# Patient Record
Sex: FEMALE | Race: WHITE | NOT HISPANIC OR LATINO | URBAN - METROPOLITAN AREA
[De-identification: names, ages, dates, MRNs, and addresses within clinical notes are randomized per-mention and may not be internally consistent; named-entity substitution may affect disease eponyms.]

---

## 2020-01-13 ENCOUNTER — OFFICE (OUTPATIENT)
Dept: URBAN - METROPOLITAN AREA CLINIC 64 | Facility: CLINIC | Age: 30
End: 2020-01-13

## 2020-01-13 VITALS
WEIGHT: 253 LBS | HEART RATE: 70 BPM | HEIGHT: 67 IN | SYSTOLIC BLOOD PRESSURE: 146 MMHG | DIASTOLIC BLOOD PRESSURE: 99 MMHG

## 2020-01-13 DIAGNOSIS — R10.12 LEFT UPPER QUADRANT PAIN: ICD-10-CM

## 2020-01-13 DIAGNOSIS — R11.0 NAUSEA: ICD-10-CM

## 2020-01-13 DIAGNOSIS — R53.83 OTHER FATIGUE: ICD-10-CM

## 2020-01-13 DIAGNOSIS — R13.10 DYSPHAGIA, UNSPECIFIED: ICD-10-CM

## 2020-01-13 DIAGNOSIS — R07.9 CHEST PAIN, UNSPECIFIED: ICD-10-CM

## 2020-01-13 PROCEDURE — 99203 OFFICE O/P NEW LOW 30 MIN: CPT | Performed by: NURSE PRACTITIONER

## 2020-01-13 RX ORDER — ONDANSETRON HYDROCHLORIDE 4 MG/1
16 TABLET, FILM COATED ORAL
Qty: 40 | Refills: 0 | Status: ACTIVE
Start: 2020-01-13

## 2020-01-28 ENCOUNTER — ON CAMPUS - OUTPATIENT (OUTPATIENT)
Dept: URBAN - METROPOLITAN AREA HOSPITAL 77 | Facility: HOSPITAL | Age: 30
End: 2020-01-28
Payer: COMMERCIAL

## 2020-01-28 DIAGNOSIS — R13.10 DYSPHAGIA, UNSPECIFIED: ICD-10-CM

## 2020-01-28 DIAGNOSIS — R11.0 NAUSEA: ICD-10-CM

## 2020-01-28 DIAGNOSIS — K29.70 GASTRITIS, UNSPECIFIED, WITHOUT BLEEDING: ICD-10-CM

## 2020-01-28 DIAGNOSIS — R07.9 CHEST PAIN, UNSPECIFIED: ICD-10-CM

## 2020-01-28 PROCEDURE — 43239 EGD BIOPSY SINGLE/MULTIPLE: CPT | Performed by: INTERNAL MEDICINE

## 2020-01-28 PROCEDURE — 43450 DILATE ESOPHAGUS 1/MULT PASS: CPT | Performed by: INTERNAL MEDICINE

## 2023-02-16 ENCOUNTER — OFFICE VISIT (OUTPATIENT)
Dept: FAMILY MEDICINE CLINIC | Facility: CLINIC | Age: 33
End: 2023-02-16
Payer: COMMERCIAL

## 2023-02-16 ENCOUNTER — TELEPHONE (OUTPATIENT)
Dept: FAMILY MEDICINE CLINIC | Facility: CLINIC | Age: 33
End: 2023-02-16

## 2023-02-16 VITALS
RESPIRATION RATE: 16 BRPM | HEIGHT: 67 IN | TEMPERATURE: 98.4 F | DIASTOLIC BLOOD PRESSURE: 70 MMHG | WEIGHT: 260 LBS | OXYGEN SATURATION: 99 % | BODY MASS INDEX: 40.81 KG/M2 | SYSTOLIC BLOOD PRESSURE: 119 MMHG | HEART RATE: 80 BPM

## 2023-02-16 DIAGNOSIS — N39.46 MIXED STRESS AND URGE URINARY INCONTINENCE: Primary | ICD-10-CM

## 2023-02-16 DIAGNOSIS — R31.21 ASYMPTOMATIC MICROSCOPIC HEMATURIA: ICD-10-CM

## 2023-02-16 DIAGNOSIS — R14.3 EXCESSIVE FLATUS: ICD-10-CM

## 2023-02-16 PROCEDURE — 99202 OFFICE O/P NEW SF 15 MIN: CPT | Performed by: FAMILY MEDICINE

## 2023-02-16 RX ORDER — FLUTICASONE PROPIONATE 50 MCG
2 SPRAY, SUSPENSION (ML) NASAL DAILY
COMMUNITY

## 2023-02-16 RX ORDER — LORATADINE 10 MG/1
CAPSULE, LIQUID FILLED ORAL
COMMUNITY

## 2023-02-16 RX ORDER — PRENATAL VIT NO.126/IRON/FOLIC 28MG-0.8MG
1 TABLET ORAL DAILY
COMMUNITY

## 2023-02-16 RX ORDER — OSELTAMIVIR PHOSPHATE 75 MG/1
1 CAPSULE ORAL EVERY 12 HOURS SCHEDULED
COMMUNITY
Start: 2022-11-27 | End: 2023-02-16

## 2023-02-16 RX ORDER — FLUCONAZOLE 150 MG/1
1 TABLET ORAL
COMMUNITY
Start: 2023-01-24 | End: 2023-02-16

## 2023-02-16 RX ORDER — MULTIPLE VITAMINS W/ MINERALS TAB 9MG-400MCG
1 TAB ORAL DAILY
COMMUNITY

## 2023-02-16 NOTE — TELEPHONE ENCOUNTER
Pt states she would like to go to [facility]\"" for pelvic floor therapy. Please order referral.    She says you asked for other general options for this and they are: [facility] or [facility].     PA approved for CT - pt given Priority # to call and schedule CT.

## 2023-02-16 NOTE — TELEPHONE ENCOUNTER
HUB to share    Referral faxed to [facility] for pelvic floor therapy.    P# [phone]  F# [phone]

## 2023-02-17 ENCOUNTER — PATIENT ROUNDING (BHMG ONLY) (OUTPATIENT)
Dept: FAMILY MEDICINE CLINIC | Facility: CLINIC | Age: 33
End: 2023-02-17
Payer: COMMERCIAL

## 2023-04-06 ENCOUNTER — TELEPHONE (OUTPATIENT)
Dept: FAMILY MEDICINE CLINIC | Facility: CLINIC | Age: 33
End: 2023-04-06

## 2023-04-06 ENCOUNTER — TELEPHONE (OUTPATIENT)
Dept: FAMILY MEDICINE CLINIC | Facility: CLINIC | Age: 33
End: 2023-04-06
Payer: COMMERCIAL

## 2023-04-06 NOTE — TELEPHONE ENCOUNTER
lvm for patient regarding ct abd/pelvis from 02/23. Inquired if scheduled, completed or if still wanting to be scheduled. Requested call back to office.    OK for HUB to read/reply

## 2023-04-06 NOTE — TELEPHONE ENCOUNTER
PT CALLED BACK AND STATED THAT SHE CALLED TO SCHEDULE THE CT, BUT HAS DECIDED NOT TO DO IT AT THIS TIME BECAUSE SHE IS BREAST FEEDING AND THEY TOLD HER SHE WOULD HAVE TO PUMP AND DUMP HER BREAST MILK.

## 2023-04-06 NOTE — TELEPHONE ENCOUNTER
PATIENT STATES SHE RECEIVED A CALL FROM US INQUIRING ABOUT THE ABD/PELVIS CT SCAN, RATHER IT HAD BEEN COMPLETED, SCHEDULED OR NEEDS TO BE SCHEDULED. PATIENT STATES SHE HAS NOT COMPLETED THE CT YET, SHE'S HAD A LOT GOING ON BUT SHE DID START PELVIC FLOOR THERAPY THAT DR. ARCHIBALD REFERRED TO. SHE IS SCHEDULING THE CT RIGHT NOW THOUGH.     PATIENT > [phone]

## 2023-07-24 ENCOUNTER — OFFICE VISIT (OUTPATIENT)
Dept: FAMILY MEDICINE CLINIC | Facility: CLINIC | Age: 33
End: 2023-07-24
Payer: COMMERCIAL

## 2023-07-24 VITALS
RESPIRATION RATE: 16 BRPM | HEART RATE: 57 BPM | HEIGHT: 67 IN | OXYGEN SATURATION: 99 % | DIASTOLIC BLOOD PRESSURE: 76 MMHG | TEMPERATURE: 98.4 F | SYSTOLIC BLOOD PRESSURE: 118 MMHG | WEIGHT: 256 LBS | BODY MASS INDEX: 40.18 KG/M2

## 2023-07-24 DIAGNOSIS — G44.039 EPISODIC PAROXYSMAL HEMICRANIA, NOT INTRACTABLE: Primary | ICD-10-CM

## 2023-07-24 PROCEDURE — 99213 OFFICE O/P EST LOW 20 MIN: CPT | Performed by: FAMILY MEDICINE

## 2023-07-24 RX ORDER — ONDANSETRON 4 MG/1
4 TABLET, ORALLY DISINTEGRATING ORAL EVERY 8 HOURS PRN
Qty: 30 TABLET | Refills: 0 | Status: SHIPPED | OUTPATIENT
Start: 2023-07-24

## 2023-07-24 RX ORDER — UREA 10 %
27 LOTION (ML) TOPICAL DAILY
COMMUNITY

## 2023-07-24 RX ORDER — SUMATRIPTAN 100 MG/1
100 TABLET, FILM COATED ORAL ONCE AS NEEDED
COMMUNITY

## 2023-07-24 RX ORDER — RIMEGEPANT SULFATE 75 MG/75MG
1 TABLET, ORALLY DISINTEGRATING ORAL DAILY PRN
Qty: 2 TABLET | Refills: 0 | COMMUNITY
Start: 2023-07-24

## 2023-07-24 NOTE — PROGRESS NOTES
Subjective   Lorenzo Davidson is a 33 y.o. female.     Chief Complaint   Patient presents with    Headache     Patient states that she gets migraines quite often.          Current Outpatient Medications:     Calcium Carb-Cholecalciferol (CALCIUM/VITAMIN D PO), 1 po qd, Disp: , Rfl:     fluticasone (FLONASE) 50 MCG/ACT nasal spray, 2 sprays into the nostril(s) as directed by provider Daily., Disp: , Rfl:     IRON-VITAMIN C PO, 1 po qd, Disp: , Rfl:     Loratadine 10 MG capsule, 1 po qd, Disp: , Rfl:     magnesium, as, gluconate (MAGONATE) 500 (27 Mg) MG tablet, Take 1 tablet by mouth Daily., Disp: , Rfl:     multivitamin with minerals tablet tablet, Take 1 tablet by mouth Daily. FENUGREEK, Disp: , Rfl:     PREBIOTIC PRODUCT PO, 1 po qd, Disp: , Rfl:     prenatal vitamin (prenatal, CLASSIC, vitamin) tablet, Take 1 tablet by mouth Daily., Disp: , Rfl:     SUMAtriptan (IMITREX) 100 MG tablet, Take 1 tablet by mouth 1 (One) Time As Needed for Migraine. Take one tablet at onset of headache. May repeat dose one time in 2 hours if headache not relieved., Disp: , Rfl:     ondansetron ODT (ZOFRAN-ODT) 4 MG disintegrating tablet, Place 1 tablet on the tongue Every 8 (Eight) Hours As Needed for Nausea or Vomiting., Disp: 30 tablet, Rfl: 0    Riboflavin 400 MG capsule, Take 1 capsule by mouth Daily., Disp: , Rfl:     Rimegepant Sulfate (Nurtec) 75 MG tablet dispersible tablet, Take 1 tablet by mouth Daily As Needed (migraines)., Disp: 2 tablet, Rfl: 0    Past Medical History:   Diagnosis Date    Anxiety     Depression     GERD     Migraines     PAP     NEG = 2023   Dr Porter    PUD     Teens    Rh negative, maternal     Seasonal allergies     Seasonal       Past Surgical History:   Procedure Laterality Date    D & C WITH SUCTION  10/13/2014       Family History   Problem Relation Age of Onset    Depression Mother     Mental illness Mother     Miscarriages / Stillbirths Mother     Diabetes Father     Depression Father     Anxiety  disorder Sister     ADD / ADHD Sister     Depression Brother     Depression Maternal Grandmother     Arthritis Maternal Grandfather     Diabetes Paternal Uncle     Anxiety disorder Half-Sister        Social History     Socioeconomic History    Marital status:    Tobacco Use    Smoking status: Never     Passive exposure: Past    Smokeless tobacco: Never   Vaping Use    Vaping Use: Never used   Substance and Sexual Activity    Alcohol use: Not Currently    Drug use: Never    Sexual activity: Yes     Partners: Male     Birth control/protection: Condom, None       History of Present Illness  34 y/o C female here for c/o's Migraines     The patient is here for complaints of headaches.    The patient states that she has had migraine headaches for a long time. She is not taking a headache pill. She states that she is still nursing her baby. She takes Aleve when she needs to. She just started taking the magnesium recently. She notes that she has sumatriptan; however, she could not take that while nursing as it causes her to fall asleep. She states that she is working outside of home. She has not taken the sumatriptan in a long time. She notes that she has 2 types of migraines, one of which affects her vision. She states that the migraines are usually alleviated by Aleve if she takes it right away. She notes that the migraines are affecting her ears and her neck that last for 3 days every time. She notes that she has not tried any sumatriptan (Imitrex) as she is nursing. She takes 2 Aleves, which has not been effective in relieving the posterior migraine. She mentions that her migraines headaches could be hormonal, as she did not have any migraine headaches when she was pregnant. She notes that the pain medications do not help once the headaches start. She has nausea with the migraine headaches.    The patient reports that the referred physical therapy was helpful to her. She notes that she is no longer having urinary  "incontinence. She was informed that her pelvic floor was too tight and her coccyx was \"stuck straight,\" which was why she could not sit on it. It was Physical Therapy who did the adjustments. She states that it was not painful in the moment; however, afterwards, she was quite sore for a few days. She states that she was having sexual activity problems before having the pelvic floor therapy, which has been fixed since physical therapy.    The following portions of the patient's history were reviewed and updated as appropriate: allergies, current medications, past family history, past medical history, past social history, past surgical history and problem list.    Review of Systems   Constitutional:  Negative for activity change, appetite change, unexpected weight gain and unexpected weight loss.   Gastrointestinal:  Positive for nausea. Negative for vomiting.   Neurological:  Positive for headache.   Psychiatric/Behavioral:  Positive for stress.      Vitals:    07/24/23 0932   BP: 118/76   Pulse: 57   Resp: 16   Temp: 98.4 °F (36.9 °C)   SpO2: 99%       Objective   Physical Exam  Vitals and nursing note reviewed.   Constitutional:       General: She is not in acute distress.     Appearance: She is not ill-appearing or toxic-appearing.   HENT:      Head: Normocephalic and atraumatic.   Pulmonary:      Effort: Pulmonary effort is normal.   Neurological:      Mental Status: She is alert and oriented to person, place, and time.      Cranial Nerves: No cranial nerve deficit.   Psychiatric:         Mood and Affect: Mood normal.         Behavior: Behavior normal.         Thought Content: Thought content normal.         Judgment: Judgment normal.     Assessment & Plan   Diagnoses and all orders for this visit:    1. 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  -     ondansetron ODT (ZOFRAN-ODT) 4 MG disintegrating tablet; Place 1 " tablet on the tongue Every 8 (Eight) Hours As Needed for Nausea or Vomiting.  Dispense: 30 tablet; Refill: 0  -     Rimegepant Sulfate (Nurtec) 75 MG tablet dispersible tablet; Take 1 tablet by mouth Daily As Needed (migraines).  Dispense: 2 tablet; Refill: 0      - The patient is advised that she can also take riboflavin for migraines.  - The patient is advised the use of ubrogepant (Ubrelvy) and Rimegepant (Nurtec ODT) to help with migraines.  - The patient is advised the use of ondansetron ODT (Zofran-ODT) for nausea.  - The patient is advised the possibility of doing physical therapy for her neck.      Transcribed from ambient dictation for Mary Ann Brooks DO by Giacomo Bowens.  07/24/23   12:45 EDT    Patient or patient representative verbalized consent to the visit recording.  I have personally performed the services described in this document as transcribed by the above individual, and it is both accurate and complete.

## 2023-11-17 ENCOUNTER — OFFICE VISIT (OUTPATIENT)
Dept: FAMILY MEDICINE CLINIC | Facility: CLINIC | Age: 33
End: 2023-11-17
Payer: COMMERCIAL

## 2023-11-17 VITALS
HEART RATE: 60 BPM | WEIGHT: 258 LBS | HEIGHT: 67 IN | SYSTOLIC BLOOD PRESSURE: 115 MMHG | OXYGEN SATURATION: 99 % | BODY MASS INDEX: 40.49 KG/M2 | RESPIRATION RATE: 16 BRPM | DIASTOLIC BLOOD PRESSURE: 80 MMHG | TEMPERATURE: 98 F

## 2023-11-17 DIAGNOSIS — G44.039 EPISODIC PAROXYSMAL HEMICRANIA, NOT INTRACTABLE: ICD-10-CM

## 2023-11-17 DIAGNOSIS — L30.1 DYSHIDROTIC HAND DERMATITIS: Primary | ICD-10-CM

## 2023-11-17 PROCEDURE — 99213 OFFICE O/P EST LOW 20 MIN: CPT | Performed by: FAMILY MEDICINE

## 2023-11-17 RX ORDER — TRIAMCINOLONE ACETONIDE 1 MG/G
1 CREAM TOPICAL 2 TIMES DAILY PRN
Qty: 45 G | Refills: 0 | Status: SHIPPED | OUTPATIENT
Start: 2023-11-17

## 2023-11-17 NOTE — PROGRESS NOTES
Answers submitted by the patient for this visit:  Other (Submitted on 11/16/2023)  Please describe your symptoms.: Appears to be eczema on hands and scalp. Follow-up on migraine tx during visit.  Have you had these symptoms before?: Yes  How long have you been having these symptoms?: Greater than 2 weeks  Primary Reason for Visit (Submitted on 11/16/2023)  What is the primary reason for your visit?: Other  Subjective   Lorenzo Davidson is a 33 y.o. female who is here with complaints of rash on hands and wrist.    She has had this rash on her hands for a long time. Occasionally, she cannot even see it, but today it is clear. She gets bumps. She has eczema. She does not have any creamy to use. It went to her wrist for the first time. She thinks it could be triggered by hand washing or stress. She has started getting bumps on the palms. She has one on her wrist. They are not as irritated right now. It broke out sometime last week. She uses soap and water at work. Sometimes, she uses hand .    She has transient aphasia with her migraines. She can get to the point where she cannot find her words. More recently, she had an episode where she wanted to say blue and dog came out. He told her to stay calm and her words would return. She had RA at that time and she knew the migraine was coming. It passes pretty quickly. She did have some dry needling done and that was helpful. She did not have a headache for a whole week afterwards. She was given a sample of Nurtec, but she does not know the prescription for it. She tried Imitrex, but it knocked her out. She is not sure if it is safe because she is nursing her baby. She has not tried Ubrelvy.    She is taking calcium, Flonase, Claritin, magnesium, multivitamin, a prebiotic, and a multivitamin. She is not taking fenugreek anymore. She is taking riboflavin, Nurtec as needed, Imitrex as needed, and Zofran as needed.    Chief Complaint   Patient presents with    Rash      Rash on hands and wrist. Patient believes that it is eczema.    Headache         Current Outpatient Medications:     Calcium Carb-Cholecalciferol (CALCIUM/VITAMIN D PO), 1 po qd, Disp: , Rfl:     fluticasone (FLONASE) 50 MCG/ACT nasal spray, 2 sprays into the nostril(s) as directed by provider Daily., Disp: , Rfl:     Loratadine 10 MG capsule, 1 po qd, Disp: , Rfl:     magnesium, as, gluconate (MAGONATE) 500 (27 Mg) MG tablet, Take 1 tablet by mouth Daily., Disp: , Rfl:     ondansetron ODT (ZOFRAN-ODT) 4 MG disintegrating tablet, Place 1 tablet on the tongue Every 8 (Eight) Hours As Needed for Nausea or Vomiting., Disp: 30 tablet, Rfl: 0    PREBIOTIC PRODUCT PO, 1 po qd, Disp: , Rfl:     prenatal vitamin (prenatal, CLASSIC, vitamin) tablet, Take 1 tablet by mouth Daily., Disp: , Rfl:     Riboflavin 400 MG capsule, Take 1 capsule by mouth Daily., Disp: , Rfl:     Rimegepant Sulfate (Nurtec) 75 MG tablet dispersible tablet, Take 1 tablet by mouth Daily As Needed (migraines)., Disp: 2 tablet, Rfl: 0    SUMAtriptan (IMITREX) 100 MG tablet, Take 1 tablet by mouth 1 (One) Time As Needed for Migraine. Take one tablet at onset of headache. May repeat dose one time in 2 hours if headache not relieved., Disp: , Rfl:     triamcinolone (KENALOG) 0.1 % cream, Apply 1 application  topically to the appropriate area as directed 2 (Two) Times a Day As Needed for Irritation or Rash., Disp: 45 g, Rfl: 0    ubrogepant (Ubrelvy) 50 MG tablet, Take 1 tablet by mouth 1 (One) Time As Needed (for migraine) for up to 1 dose. May repeat q2 hrs prn symptoms to max of 200mg /day, Disp: 3 tablet, Rfl: 0    Past Medical History:   Diagnosis Date    Anxiety     Depression     GERD     Migraines     PAP     NEG = 2023   Dr Porter    PUD     Teens    Rh negative, maternal     Seasonal allergies     Seasonal       Past Surgical History:   Procedure Laterality Date    D & C WITH SUCTION  10/13/2014       Family History   Problem Relation Age of Onset     Depression Mother     Mental illness Mother     Miscarriages / Stillbirths Mother     Diabetes Father     Depression Father     Anxiety disorder Sister     ADD / ADHD Sister     Depression Brother     Depression Maternal Grandmother     Arthritis Maternal Grandfather     Diabetes Paternal Uncle     Anxiety disorder Half-Sister        Social History     Socioeconomic History    Marital status:    Tobacco Use    Smoking status: Never     Passive exposure: Past    Smokeless tobacco: Never   Vaping Use    Vaping Use: Never used   Substance and Sexual Activity    Alcohol use: Not Currently    Drug use: Never    Sexual activity: Yes     Partners: Male     Birth control/protection: Condom, None       Rash  This is a recurrent problem. The current episode started more than 1 year ago. The problem has been gradually worsening since onset. The affected locations include the right hand and left hand. The rash is characterized by itchiness. It is unknown if there was an exposure to a precipitant. Pertinent negatives include no vomiting. Past treatments include moisturizer. The treatment provided no relief.        The following portions of the patient's history were reviewed and updated as appropriate: allergies, current medications, past family history, past medical history, past social history, past surgical history and problem list.    Review of Systems   Constitutional:  Negative for activity change, appetite change, unexpected weight gain and unexpected weight loss.   HENT:  Negative for trouble swallowing.    Gastrointestinal:  Negative for nausea and vomiting.   Skin:  Positive for color change and rash. Negative for wound.   Neurological:  Positive for headache.       Vitals:    11/17/23 0826   BP: 115/80   Pulse: 60   Resp: 16   Temp: 98 °F (36.7 °C)   SpO2: 99%       Objective   Physical Exam  Vitals and nursing note reviewed.   Constitutional:       General: She is not in acute distress.     Appearance: She  is not ill-appearing or toxic-appearing.   HENT:      Head: Normocephalic and atraumatic.   Pulmonary:      Effort: Pulmonary effort is normal.   Musculoskeletal:      Comments: No visible rash on b/l hands   Neurological:      Mental Status: She is alert and oriented to person, place, and time.      Cranial Nerves: No cranial nerve deficit.   Psychiatric:         Attention and Perception: Attention and perception normal.         Mood and Affect: Mood and affect normal.         Speech: Speech normal.         Behavior: Behavior normal. Behavior is cooperative.         Thought Content: Thought content normal.         Cognition and Memory: Cognition and memory normal.         Judgment: Judgment normal.         Assessment & Plan   Diagnoses and all orders for this visit:    1. Dyshidrotic hand dermatitis (Primary)    2. Episodic paroxysmal hemicrania, not intractable    Other orders  -     triamcinolone (KENALOG) 0.1 % cream; Apply 1 application  topically to the appropriate area as directed 2 (Two) Times a Day As Needed for Irritation or Rash.  Dispense: 45 g; Refill: 0  -     ubrogepant (Ubrelvy) 50 MG tablet; Take 1 tablet by mouth 1 (One) Time As Needed (for migraine) for up to 1 dose. May repeat q2 hrs prn symptoms to max of 200mg /day  Dispense: 3 tablet; Refill: 0      1. Eczema.  I will prescribe a medium-potency cream to be applied to the affected areas.    2. Migraines.  We discussed that it is common to have transient aphasia with migraines. I will prescribe Ubrelvy 50 mg 3 pills. She may repeat every 2 hours, maximum of 200 mg per day.           Transcribed from ambient dictation for Mary Ann Brooks DO by Shi Lazaro.  11/17/23   09:50 EST    Patient or patient representative verbalized consent to the visit recording.  I have personally performed the services described in this document as transcribed by the above individual, and it is both accurate and complete.

## 2024-07-18 ENCOUNTER — OFFICE VISIT (OUTPATIENT)
Dept: FAMILY MEDICINE CLINIC | Facility: CLINIC | Age: 34
End: 2024-07-18
Payer: COMMERCIAL

## 2024-07-18 ENCOUNTER — PRIOR AUTHORIZATION (OUTPATIENT)
Dept: FAMILY MEDICINE CLINIC | Facility: CLINIC | Age: 34
End: 2024-07-18

## 2024-07-18 VITALS
DIASTOLIC BLOOD PRESSURE: 81 MMHG | WEIGHT: 258 LBS | OXYGEN SATURATION: 99 % | HEART RATE: 60 BPM | HEIGHT: 66 IN | BODY MASS INDEX: 41.46 KG/M2 | TEMPERATURE: 97.8 F | SYSTOLIC BLOOD PRESSURE: 130 MMHG | RESPIRATION RATE: 18 BRPM

## 2024-07-18 DIAGNOSIS — S29.019A THORACIC MYOFASCIAL STRAIN, INITIAL ENCOUNTER: Primary | ICD-10-CM

## 2024-07-18 DIAGNOSIS — N76.1 SUBACUTE VAGINITIS: ICD-10-CM

## 2024-07-18 PROCEDURE — 99213 OFFICE O/P EST LOW 20 MIN: CPT | Performed by: FAMILY MEDICINE

## 2024-07-18 RX ORDER — CELECOXIB 200 MG/1
200 CAPSULE ORAL 2 TIMES DAILY PRN
Qty: 60 CAPSULE | Refills: 0 | Status: SHIPPED | OUTPATIENT
Start: 2024-07-18

## 2024-07-18 RX ORDER — METHYLPREDNISOLONE 4 MG/1
TABLET ORAL
Qty: 21 TABLET | Refills: 0 | Status: SHIPPED | OUTPATIENT
Start: 2024-07-18

## 2024-07-18 RX ORDER — LANOLIN ALCOHOL/MO/W.PET/CERES
1000 CREAM (GRAM) TOPICAL DAILY
Start: 2024-07-18

## 2024-07-18 RX ORDER — CHOLECALCIFEROL (VITAMIN D3) 50 MCG
2000 TABLET ORAL DAILY
Start: 2024-07-18 | End: 2025-07-18

## 2024-07-18 RX ORDER — CHLORAL HYDRATE 500 MG
1000 CAPSULE ORAL
Start: 2024-07-18

## 2024-07-18 RX ORDER — METHOCARBAMOL 500 MG/1
TABLET, FILM COATED ORAL
Qty: 10 TABLET | Refills: 0 | Status: SHIPPED | OUTPATIENT
Start: 2024-07-18

## 2024-07-18 NOTE — PROGRESS NOTES
Answers submitted by the patient for this visit:  Other (Submitted on 7/16/2024)  Please describe your symptoms.: Injured shoulder/neck area a couple months ago and not recovering well.  Have you had these symptoms before?: No  How long have you been having these symptoms?: Greater than 2 weeks  Please list any medications you are currently taking for this condition.: OTC pain relief.  Primary Reason for Visit (Submitted on 7/16/2024)  What is the primary reason for your visit?: Other  Subjective   Lorenzo Davidson is a 34 y.o. female.     Chief Complaint   Patient presents with    Shoulder Pain     L side shoulder pain/ numbness x 2 mon         Current Outpatient Medications:     Calcium Carb-Cholecalciferol (CALCIUM/VITAMIN D PO), 1 po qd, Disp: , Rfl:     fluticasone (FLONASE) 50 MCG/ACT nasal spray, 2 sprays into the nostril(s) as directed by provider Daily., Disp: , Rfl:     Loratadine 10 MG capsule, 1 po qd, Disp: , Rfl:     ondansetron ODT (ZOFRAN-ODT) 4 MG disintegrating tablet, Place 1 tablet on the tongue Every 8 (Eight) Hours As Needed for Nausea or Vomiting., Disp: 30 tablet, Rfl: 0    PREBIOTIC PRODUCT PO, 1 po qd, Disp: , Rfl:     SUMAtriptan (IMITREX) 100 MG tablet, Take 1 tablet by mouth 1 (One) Time As Needed for Migraine. Take one tablet at onset of headache. May repeat dose one time in 2 hours if headache not relieved., Disp: , Rfl:     triamcinolone (KENALOG) 0.1 % cream, Apply 1 application  topically to the appropriate area as directed 2 (Two) Times a Day As Needed for Irritation or Rash., Disp: 45 g, Rfl: 0    celecoxib (CeleBREX) 200 MG capsule, Take 1 capsule by mouth 2 (Two) Times a Day As Needed for Moderate Pain., Disp: 60 capsule, Rfl: 0    Cholecalciferol (Vitamin D) 50 MCG (2000 UT) tablet, Take 1 tablet by mouth Daily., Disp: , Rfl:     methocarbamol (ROBAXIN) 500 MG tablet, 1-2 tabs po qhs prn, Disp: 10 tablet, Rfl: 0    methylPREDNISolone (MEDROL) 4 MG dose pack, Take as directed on  package instructions., Disp: 21 tablet, Rfl: 0    Omega-3 Fatty Acids (fish oil) 1000 MG capsule capsule, Take 1 capsule by mouth Daily With Breakfast., Disp: , Rfl:     Tyrosine 500 MG tablet, Take 1 tablet by mouth Daily., Disp: , Rfl:     vitamin B-12 (CYANOCOBALAMIN) 1000 MCG tablet, Take 1 tablet by mouth Daily., Disp: , Rfl:     Past Medical History:   Diagnosis Date    Anxiety     Depression     GERD     Migraines     PAP     NEG = 2023   Dr Porter    PUD     Teens    Rh negative, maternal     Seasonal allergies     Seasonal       Past Surgical History:   Procedure Laterality Date    D & C WITH SUCTION  10/13/2014       Family History   Problem Relation Age of Onset    Depression Mother     Mental illness Mother     Miscarriages / Stillbirths Mother     Diabetes Father     Depression Father     Anxiety disorder Sister     ADD / ADHD Sister     Depression Brother     Depression Maternal Grandmother     Arthritis Maternal Grandfather     Diabetes Paternal Uncle     Anxiety disorder Half-Sister        Social History     Socioeconomic History    Marital status:    Tobacco Use    Smoking status: Never     Passive exposure: Past    Smokeless tobacco: Never   Vaping Use    Vaping status: Never Used   Substance and Sexual Activity    Alcohol use: Not Currently    Drug use: Never    Sexual activity: Yes     Partners: Male     Birth control/protection: Condom, None       History of Present Illness  The patient is a 34-year-old  female here with a left shoulder injury.    She has been experiencing pain in her left shoulder for over two months. Initially, she believed it was healing, but it worsened after her gym workouts. The pain is located behind the scapula. Dry needling was performed around the scapular area and rhomboid area, which alleviated her migraines. Two different medical massages exacerbated the shoulder pain. The pain is constant, regardless of the position she is sitting. She is right-hand  dominant, but compensates with her left hand due to lifting heavy objects and fine motor skills.     The initial injury occurred when she reached into the back seat, felt a pop, and experienced immediate pain. The following morning, she woke up with stiffness. She has tried ice and heat for relief. She sleeps with her arm above her head, otherwise it causes discomfort. She has been taking Aleve and ibuprofen, but finds that backstroke motion, swimming, and walking on the treadmill help. She has not seen a chiropractor. She is still breastfeeding.    Since giving birth, she has been experiencing vaginal dryness and itching. She sought medical attention at [place], where no abnormalities were found. She was advised to use Replens, which sometimes provides relief. The itching is described as irritating and swollen. She experiences pain during intercourse due to dryness. She is currently menstruating.    She is currently taking calcium, Flonase, Claritin, magnesium, Zofran as needed, Imitrex as needed, and steroid cream as needed. She also takes L-tyrosine, an amino acid, when her blood pressure is not high, which seems to increase her heart rate in the morning. Diltiazem has been effective for her focus, and she has been taking it for a couple of months. She also takes B12, vitamin D, and fish oil.    She denies any new surgeries or new medical problems.   She denies smoking, alcohol, or drug use.   She has no known allergies.        The following portions of the patient's history were reviewed and updated as appropriate: allergies, current medications, past family history, past medical history, past social history, past surgical history and problem list.    Review of Systems   Constitutional:  Negative for activity change, appetite change, fatigue and unexpected weight gain.   Respiratory:  Negative for cough, chest tightness and shortness of breath.    Cardiovascular:  Negative for chest pain, palpitations and leg  swelling.   Genitourinary:  Positive for dyspareunia and vaginal pain. Negative for frequency, urgency, urinary incontinence, vaginal bleeding and vaginal discharge.   Musculoskeletal:  Positive for arthralgias, back pain and myalgias.   Skin:  Negative for rash.   Neurological:  Negative for dizziness, facial asymmetry, speech difficulty, weakness, light-headedness, headache, memory problem and confusion.       Vitals:    07/18/24 0753   BP: 130/81   Pulse: 60   Resp: 18   Temp: 97.8 °F (36.6 °C)   SpO2: 99%       Objective   Physical Exam  Vitals and nursing note reviewed.   Constitutional:       General: She is not in acute distress.     Appearance: She is not ill-appearing or toxic-appearing.   HENT:      Head: Normocephalic and atraumatic.   Pulmonary:      Effort: Pulmonary effort is normal.   Musculoskeletal:      Left shoulder: No tenderness or bony tenderness. Normal range of motion. Normal strength.        Back:       Comments: +TTP @ left thoracic paraspinal ms   Neurological:      Mental Status: She is alert.       Physical Exam  Vital Signs  [id number].     Class 3 Severe Obesity (BMI >=40). Obesity-related health conditions include the following: GERD. Obesity is unchanged. BMI is is above average; BMI management plan is completed. We discussed portion control and increasing exercise.      Assessment & Plan   Diagnoses and all orders for this visit:    1. Thoracic myofascial strain, initial encounter (Primary)    2. Subacute vaginitis    Other orders  -     methylPREDNISolone (MEDROL) 4 MG dose pack; Take as directed on package instructions.  Dispense: 21 tablet; Refill: 0  -     celecoxib (CeleBREX) 200 MG capsule; Take 1 capsule by mouth 2 (Two) Times a Day As Needed for Moderate Pain.  Dispense: 60 capsule; Refill: 0  -     methocarbamol (ROBAXIN) 500 MG tablet; 1-2 tabs po qhs prn  Dispense: 10 tablet; Refill: 0  -     Tyrosine 500 MG tablet; Take 1 tablet by mouth Daily.  -     Cholecalciferol  (Vitamin D) 50 MCG (2000 UT) tablet; Take 1 tablet by mouth Daily.  -     vitamin B-12 (CYANOCOBALAMIN) 1000 MCG tablet; Take 1 tablet by mouth Daily.  -     Omega-3 Fatty Acids (fish oil) 1000 MG capsule capsule; Take 1 capsule by mouth Daily With Breakfast.      Assessment & Plan  1. Back pain  The injury appears to be musculoskeletal in nature, exacerbated by certain activities. She was advised to avoid heavy objects and take Robaxin at night. Celebrex was prescribed to be taken once daily as needed. Tylenol was suggested for pain management. Ice application, stretching, and swimming were recommended. Chiropractic treatment was suggested.    2. Vaginal dryness and itching.  Topical estrogen was recommended for twice-weekly use for a month. An unscented baby wipe was recommended. A and D with zinc was suggested for use as a barrier.     Results            Patient or patient representative verbalized consent for the use of Ambient Listening during the visit with  Mary Ann Brooks DO for chart documentation. 8/4/2024  08:24 EDT

## 2024-07-18 NOTE — TELEPHONE ENCOUNTER
HUB to read    PA was sent for Celecoxib 200MG capsules    Waiting on the outcome from insurance.

## 2024-07-22 DIAGNOSIS — M25.512 ACUTE PAIN OF LEFT SHOULDER: Primary | ICD-10-CM
